# Patient Record
Sex: MALE | Race: WHITE | HISPANIC OR LATINO | Employment: FULL TIME | URBAN - METROPOLITAN AREA
[De-identification: names, ages, dates, MRNs, and addresses within clinical notes are randomized per-mention and may not be internally consistent; named-entity substitution may affect disease eponyms.]

---

## 2023-02-09 ENCOUNTER — TELEPHONE (OUTPATIENT)
Dept: FAMILY MEDICINE | Facility: CLINIC | Age: 43
End: 2023-02-09

## 2023-02-09 NOTE — TELEPHONE ENCOUNTER
Patient returned call. RN read message below. Patient verbalized good understanding and agreed to plan.     Ramila Rojo RN    [facility]

## 2023-02-09 NOTE — TELEPHONE ENCOUNTER
M for patient to call back for more information pertaining to his immigration physical with Dr. Shipman on 2/13/2023 at 11:00 with an arrival time of 10:35AM.     Initial price to have immigration physical is $283.00, additional charges can be added for immunizations, labs, and other concerns.   Patient should bring immunization records, photo ID, passport, work permit, and I-94 card.  Patients insurance will not cover visit, patient should bring a credit or debit card. No cash or check.     Message with additional concerns.     11:11 AM  02/09/23  Estela Singh CMA

## 2023-02-13 ENCOUNTER — OFFICE VISIT (OUTPATIENT)
Dept: FAMILY MEDICINE | Facility: CLINIC | Age: 43
End: 2023-02-13

## 2023-02-13 VITALS
OXYGEN SATURATION: 99 % | DIASTOLIC BLOOD PRESSURE: 83 MMHG | TEMPERATURE: 97.9 F | SYSTOLIC BLOOD PRESSURE: 136 MMHG | BODY MASS INDEX: 33.36 KG/M2 | WEIGHT: 225.2 LBS | HEIGHT: 69 IN | RESPIRATION RATE: 20 BRPM | HEART RATE: 83 BPM

## 2023-02-13 DIAGNOSIS — Z53.9 ERRONEOUS ENCOUNTER--DISREGARD: Primary | ICD-10-CM

## 2023-02-13 ASSESSMENT — PAIN SCALES - GENERAL: PAINLEVEL: NO PAIN (0)

## 2023-02-13 NOTE — PROGRESS NOTES
Patient was here for immigration physical  However he does not have all his vaccination records  In particular he had only 1 dose of COVID-vaccine that was on 2 February  He needs a second dose  He is going to get that on 23 February  Without that I cannot fill in the paperwork  Explained this to him  He is going to reschedule

## 2023-02-13 NOTE — PROGRESS NOTES
{PROVIDER CHARTING PREFERENCE:950502}    German Gan is a 43 year old{ACCOMPANIED BY STATEMENT (Optional):670765}, presenting for the following health issues:  No chief complaint on file.      HPI     Patient is here for immigration physical   Patient rescheduled on 2/27/23 to complete immigration physical, patient signed ASHLEY to pull records     {SUPERLIST (Optional):518390}  {additonal problems for provider to add (Optional):040542}    Review of Systems   {ROS COMP (Optional):703017}      Objective    There were no vitals taken for this visit.  There is no height or weight on file to calculate BMI.  Physical Exam   {Exam List (Optional):649987}    {Diagnostic Test Results (Optional):075967}    {AMBULATORY ATTESTATION (Optional):342035}

## 2023-02-27 ENCOUNTER — OFFICE VISIT (OUTPATIENT)
Dept: FAMILY MEDICINE | Facility: CLINIC | Age: 43
End: 2023-02-27

## 2023-02-27 VITALS
RESPIRATION RATE: 24 BRPM | BODY MASS INDEX: 34.95 KG/M2 | HEART RATE: 94 BPM | HEIGHT: 67 IN | DIASTOLIC BLOOD PRESSURE: 82 MMHG | OXYGEN SATURATION: 99 % | TEMPERATURE: 98.7 F | WEIGHT: 222.7 LBS | SYSTOLIC BLOOD PRESSURE: 134 MMHG

## 2023-02-27 DIAGNOSIS — Z23 NEED FOR PROPHYLACTIC VACCINATION AND INOCULATION AGAINST INFLUENZA: ICD-10-CM

## 2023-02-27 DIAGNOSIS — Z02.89 HISTORY AND PHYSICAL EXAMINATION, IMMIGRATION: Primary | ICD-10-CM

## 2023-02-27 PROCEDURE — 90686 IIV4 VACC NO PRSV 0.5 ML IM: CPT | Performed by: INTERNAL MEDICINE

## 2023-02-27 PROCEDURE — 86481 TB AG RESPONSE T-CELL SUSP: CPT | Performed by: INTERNAL MEDICINE

## 2023-02-27 PROCEDURE — 99385 PREV VISIT NEW AGE 18-39: CPT | Mod: 25 | Performed by: INTERNAL MEDICINE

## 2023-02-27 PROCEDURE — 86780 TREPONEMA PALLIDUM: CPT | Performed by: INTERNAL MEDICINE

## 2023-02-27 PROCEDURE — 87591 N.GONORRHOEAE DNA AMP PROB: CPT | Performed by: INTERNAL MEDICINE

## 2023-02-27 PROCEDURE — 36415 COLL VENOUS BLD VENIPUNCTURE: CPT | Performed by: INTERNAL MEDICINE

## 2023-02-27 PROCEDURE — 90471 IMMUNIZATION ADMIN: CPT | Performed by: INTERNAL MEDICINE

## 2023-02-27 RX ORDER — GLIPIZIDE 5 MG/1
1 TABLET, FILM COATED, EXTENDED RELEASE ORAL
COMMUNITY
Start: 2023-01-23

## 2023-02-27 RX ORDER — LISINOPRIL 2.5 MG/1
1 TABLET ORAL
COMMUNITY
Start: 2023-02-14

## 2023-02-27 RX ORDER — WARFARIN SODIUM 5 MG/1
TABLET ORAL
COMMUNITY
Start: 2022-12-02

## 2023-02-27 ASSESSMENT — PAIN SCALES - GENERAL: PAINLEVEL: NO PAIN (0)

## 2023-02-27 NOTE — PROGRESS NOTES
"  Assessment & Plan     History and physical examination, immigration  Patient is here for immigration physical  I have reviewed the vaccine records from [place]  He also has some vaccine records from [place]  In fact I helped him to get the vaccine records from the [place] [facility] when he saw me last time  He did have a history of having varicella when he was around 7 years old  He was exposed to a positive case in his household  He has type 2 diabetes and is also on Coumadin for history of thrombosis of splenic artery  Denies any history of alcohol abuse  Denies any history of drug abuse  No history of any mental health issues  No history of any rashes  - Quantiferon TB Gold Plus; Future  - Neisseria gonorrhoeae PCR; Future  - Treponema Abs w Reflex to RPR and Titer; Future  - Quantiferon TB Gold Plus  - Neisseria gonorrhoeae PCR  - Treponema Abs w Reflex to RPR and Titer    Need for prophylactic vaccination and inoculation against influenza    - INFLUENZA VACCINE IM > 6 MONTHS VALENT IIV4 (AFLURIA/FLUZONE)      30 minutes spent on the date of the encounter doing chart review, history and exam, documentation and further activities per the note       Nicotine/Tobacco Cessation:  He reports that he has been smoking cigarettes. He has never used smokeless tobacco.  Nicotine/Tobacco Cessation Plan:   Information offered: Patient not interested at this time      BMI:   Estimated body mass index is 34.95 kg/m  as calculated from the following:    Height as of this encounter: 1.7 m (5' 6.93\").    Weight as of this encounter: 101 kg (222 lb 11.2 oz).           No follow-ups on file.    Dimitry Shipman MD  [facility]    German Gan is a 43 year old, presenting for the following health issues:  Immigration Physical and Imm/Inj (Flu Shot)      HPI   Here for immigration physical        Review of Systems   Constitutional, HEENT, cardiovascular, pulmonary, gi and gu systems " "are negative, except as otherwise noted.      Objective    /82   Pulse 94   Temp 98.7  F (37.1  C) (Oral)   Resp 24   Ht 1.7 m (5' 6.93\")   Wt 101 kg (222 lb 11.2 oz)   SpO2 99%   BMI 34.95 kg/m    Body mass index is 34.95 kg/m .  Physical Exam   GENERAL: healthy, alert and no distress  NECK: no adenopathy, no asymmetry, masses, or scars and thyroid normal to palpation  RESP: lungs clear to auscultation - no rales, rhonchi or wheezes  CV: regular rate and rhythm, normal S1 S2, no S3 or S4, no murmur, click or rub, no peripheral edema and peripheral pulses strong  ABDOMEN: soft, nontender, no hepatosplenomegaly, no masses and bowel sounds normal  MS: no gross musculoskeletal defects noted, no edema  NEURO: Normal strength and tone, mentation intact and speech normal  PSYCH: mentation appears normal, affect normal/bright                    "

## 2023-02-28 LAB
N GONORRHOEA DNA SPEC QL NAA+PROBE: NEGATIVE
T PALLIDUM AB SER QL: NONREACTIVE

## 2023-03-01 LAB
GAMMA INTERFERON BACKGROUND BLD IA-ACNC: 0.05 IU/ML
M TB IFN-G BLD-IMP: NEGATIVE
M TB IFN-G CD4+ BCKGRND COR BLD-ACNC: 9.95 IU/ML
MITOGEN IGNF BCKGRD COR BLD-ACNC: 0.01 IU/ML
MITOGEN IGNF BCKGRD COR BLD-ACNC: 0.03 IU/ML
QUANTIFERON MITOGEN: 10 IU/ML
QUANTIFERON NIL TUBE: 0.05 IU/ML
QUANTIFERON TB1 TUBE: 0.06 IU/ML
QUANTIFERON TB2 TUBE: 0.08